# Patient Record
Sex: FEMALE | URBAN - METROPOLITAN AREA
[De-identification: names, ages, dates, MRNs, and addresses within clinical notes are randomized per-mention and may not be internally consistent; named-entity substitution may affect disease eponyms.]

---

## 2019-06-04 ENCOUNTER — APPOINTMENT (OUTPATIENT)
Age: 74
Setting detail: DERMATOLOGY
End: 2019-06-10

## 2019-06-04 VITALS
DIASTOLIC BLOOD PRESSURE: 84 MMHG | OXYGEN SATURATION: 94 % | WEIGHT: 175.4 LBS | HEART RATE: 108 BPM | RESPIRATION RATE: 17 BRPM | SYSTOLIC BLOOD PRESSURE: 199 MMHG

## 2019-06-04 DIAGNOSIS — N64.81 PTOSIS OF BREAST: ICD-10-CM

## 2019-06-04 DIAGNOSIS — T85.44 CAPSULAR CONTRACTURE OF BREAST IMPLANT: ICD-10-CM

## 2019-06-04 PROBLEM — T85.44XA CAPSULAR CONTRACTURE OF BREAST IMPLANT, INITIAL ENCOUNTER: Status: ACTIVE | Noted: 2019-06-04

## 2019-06-04 PROCEDURE — OTHER COUNSELING - CAPSULAR CONTRACTURE: OTHER

## 2019-06-04 PROCEDURE — OTHER COUNSELING - BREAST PTOSIS: OTHER

## 2019-06-04 ASSESSMENT — LOCATION ZONE DERM: LOCATION ZONE: TRUNK

## 2019-06-04 ASSESSMENT — LOCATION DETAILED DESCRIPTION DERM
LOCATION DETAILED: LEFT PERIAREOLAR BREAST 9-10:00 REGION
LOCATION DETAILED: RIGHT MEDIAL BREAST 3-4:00 REGION

## 2019-06-04 ASSESSMENT — LOCATION SIMPLE DESCRIPTION DERM
LOCATION SIMPLE: LEFT BREAST
LOCATION SIMPLE: RIGHT BREAST

## 2019-06-04 NOTE — HPI: BREAST (BREAST AUGMENTATION CONSULTATION)
Which Breast(S) Are You Concerned About?: bilateral breasts
Which Side(S)?: the left breast
How Severe Is The Micromastia?: mild
Which Side(S)?: both breasts (with identical techniques and implants)
Is This A New Presentation, Or A Follow-Up?: Breast augmentation consultation
Referral Source: Dr. Britton
Additional History: Grade 4 Left breast capsular contracture. Grade 2/3 Right breast capsular contracture.\\nPt needs a mammogram or MRI for bilateral breast.